# Patient Record
Sex: FEMALE | Race: WHITE
[De-identification: names, ages, dates, MRNs, and addresses within clinical notes are randomized per-mention and may not be internally consistent; named-entity substitution may affect disease eponyms.]

---

## 2018-06-05 ENCOUNTER — HOSPITAL ENCOUNTER (INPATIENT)
Dept: HOSPITAL 74 - JLDR | Age: 38
LOS: 4 days | Discharge: HOME | DRG: 540 | End: 2018-06-09
Attending: OBSTETRICS & GYNECOLOGY | Admitting: OBSTETRICS & GYNECOLOGY
Payer: COMMERCIAL

## 2018-06-05 VITALS — BODY MASS INDEX: 29.9 KG/M2

## 2018-06-05 DIAGNOSIS — O34.211: Primary | ICD-10-CM

## 2018-06-05 DIAGNOSIS — Z30.2: ICD-10-CM

## 2018-06-05 DIAGNOSIS — Z3A.38: ICD-10-CM

## 2018-06-05 LAB
ANION GAP SERPL CALC-SCNC: 7 MMOL/L (ref 8–16)
APTT BLD: 23.4 SECONDS (ref 26.9–34.4)
BASOPHILS # BLD: 0.3 % (ref 0–2)
BUN SERPL-MCNC: 10 MG/DL (ref 7–18)
CALCIUM SERPL-MCNC: 8.9 MG/DL (ref 8.5–10.1)
CHLORIDE SERPL-SCNC: 107 MMOL/L (ref 98–107)
CO2 SERPL-SCNC: 24 MMOL/L (ref 21–32)
CREAT SERPL-MCNC: 0.7 MG/DL (ref 0.55–1.02)
DEPRECATED RDW RBC AUTO: 14.9 % (ref 11.6–15.6)
EOSINOPHIL # BLD: 0.7 % (ref 0–4.5)
GLUCOSE SERPL-MCNC: 95 MG/DL (ref 74–106)
HCT VFR BLD CALC: 37.1 % (ref 32.4–45.2)
HGB BLD-MCNC: 12.5 GM/DL (ref 10.7–15.3)
INR BLD: 0.96 (ref 0.82–1.09)
LYMPHOCYTES # BLD: 22.9 % (ref 8–40)
MCH RBC QN AUTO: 29.8 PG (ref 25.7–33.7)
MCHC RBC AUTO-ENTMCNC: 33.6 G/DL (ref 32–36)
MCV RBC: 88.7 FL (ref 80–96)
MONOCYTES # BLD AUTO: 8.9 % (ref 3.8–10.2)
NEUTROPHILS # BLD: 67.2 % (ref 42.8–82.8)
PLATELET # BLD AUTO: 108 K/MM3 (ref 134–434)
PMV BLD: 12 FL (ref 7.5–11.1)
POTASSIUM SERPLBLD-SCNC: 4.6 MMOL/L (ref 3.5–5.1)
PT PNL PPP: 10.8 SEC (ref 9.7–13)
RBC # BLD AUTO: 4.18 M/MM3 (ref 3.6–5.2)
SODIUM SERPL-SCNC: 138 MMOL/L (ref 136–145)
WBC # BLD AUTO: 6.3 K/MM3 (ref 4–10)

## 2018-06-06 LAB
ARTERIAL BLOOD GAS PCO2: 53 MMHG (ref 35–45)
BASE EXCESS BLDA CALC-SCNC: -1.6 MEQ/L (ref -2–2)
PH BLDV: 7.37 [PH] (ref 7.32–7.42)
PO2 BLDA: 11.1 MMHG (ref 80–100)
SAO2 % BLDA: 11.7 % (ref 90–98.9)
VENOUS PC02: 43 MMHG (ref 38–52)
VENOUS PO2: 21.3 MMHG (ref 28–48)

## 2018-06-06 PROCEDURE — 0UL70ZZ OCCLUSION OF BILATERAL FALLOPIAN TUBES, OPEN APPROACH: ICD-10-PCS | Performed by: OBSTETRICS & GYNECOLOGY

## 2018-06-06 RX ADMIN — FERROUS SULFATE TAB EC 324 MG (65 MG FE EQUIVALENT) SCH: 324 (65 FE) TABLET DELAYED RESPONSE at 22:53

## 2018-06-06 RX ADMIN — IBUPROFEN PRN MG: 800 INJECTION INTRAVENOUS at 09:30

## 2018-06-06 RX ADMIN — LABETALOL HCL SCH: 100 TABLET, FILM COATED ORAL at 22:00

## 2018-06-06 RX ADMIN — FERROUS SULFATE TAB EC 324 MG (65 MG FE EQUIVALENT) SCH: 324 (65 FE) TABLET DELAYED RESPONSE at 09:42

## 2018-06-06 RX ADMIN — LABETALOL HCL SCH MG: 100 TABLET, FILM COATED ORAL at 10:15

## 2018-06-06 RX ADMIN — Medication SCH: at 10:20

## 2018-06-06 RX ADMIN — IBUPROFEN PRN MG: 800 INJECTION INTRAVENOUS at 21:57

## 2018-06-06 NOTE — HP
Past Medical History





- Admission


Chief Complaint: Labor pain


History of Present Illness: 





36 yo  @ 38 weeks gestation with h/o Chronic hypertension and gestational 

diabetes, admitted for labor pain.


She had 2 previous C-Sections. She 's pre op for repeat  and bilateral 

tubal ligation as consent was previously signed.


History Source: Patient





- Past Medical History


...: 4


...Para: 2


...Term: 1


...: 1


...Spon : 1


...Induced : 0


...Multiple Gestation: 0


...LMP: 17


... Weeks Gestation by Dates: 37.2


...EDC by Dates: 18


...EDC by Sono: 18





- Past Surgical History


Past Surgical History: Yes: 


Hx Myomectomy: No


Hx Transabdominal Cerclage: No





- Smoking History


Smoking history: Never smoked


Have you smoked in the past 12 months: No





- Alcohol/Substance Use


Hx Alcohol Use: No





Home Medications





- Allergies


Allergies/Adverse Reactions: 


 Allergies











Allergy/AdvReac Type Severity Reaction Status Date / Time


 


No Known Allergies Allergy   Verified 18 19:22














- Home Medications


Home Medications: 


Ambulatory Orders





Aspirin [Mahnomen Aspirin] 81 mg PO DAILY 18 


Labetalol HCl 200 mg PO DAILY 18 


Prenatal Vit 108/Iron/Folic AC [Prenatal One Tablet] 1 each PO DAILY 18 











Family Disease History





- Family Disease History


Family History: Unremarkable





Review of Systems





- Review of Systems


Constitutional: reports: No Symptoms


Eyes: reports: No Symptoms


HENT: reports: No Symptoms


Neck: reports: No Symptoms


Cardiovascular: reports: No Symptoms


Respiratory: reports: No Symptoms


Gastrointestinal: reports: No Symptoms


Genitourinary: reports: Pain


Breasts: reports: No Symptoms Reported


Musculoskeletal: reports: No Symptoms


Integumentary: reports: No Symptoms


Neurological: reports: No Symptoms


Endocrine: reports: No Symptoms


Hematology/Lymphatic: reports: No Symptoms


Psychiatric: reports: No Symptoms


Pain Intensity: 7





Physical Exam - Maternity


Vital Signs: 


 Vital Signs











Temperature  98.2 F   18 05:50


 


Pulse Rate  94 H  18 05:50


 


Respiratory Rate  20   18 05:50


 


Blood Pressure  124/94   18 05:50


 


O2 Sat by Pulse Oximetry (%)      











Constitutional: Yes: Well Nourished


Eyes: Yes: Conjunctiva Clear


HENT: Yes: Atraumatic


Neck: Yes: Supple


Cardiovascular: Yes: Regular Rate and Rhythm


Lungs: Clear to auscultation





- Abdominal Exam/OB


Number of Fetuses: Single


Fetal Presentation: Vertex





- Vaginal Exam/OB


Vaginal Bleediing: No


Fetal Presentation: Vertex/Position





- Physical Exam


Musculoskeletal: Yes: WNL


Extremities: Yes: WNL


...Motor Strength: WNL


Psychiatric: Yes: Alert, Oriented





- Labs


Lab Results: 


 CBC, BMP





 18 21:55 





 18 22:30 











Problem List





- Problems


(1) Previous  section complicating pregnancy, antepartum condition or 

complication


Code(s): O34.219 - MATERNAL CARE FOR UNSP TYPE SCAR FROM PREVIOUS  DEL 

  





(2) Status post repeat low transverse  section


Code(s): Z98.891 - HISTORY OF UTERINE SCAR FROM PREVIOUS SURGERY   





Assessment/Plan





Previous  in labor


Chronic hypertension


Pre op for repeat  and BTL


Consent signed


Anesthesia to see patient

## 2018-06-06 NOTE — OP
Operative Note





- Note:


Operative Date: 18


Pre-Operative Diagnosis: Previous  in labor / Multiparity


Operation: Repeat Low Transverse  / Bilateral tubal ligation


Post-Operative Diagnosis: Same as Pre-op


Surgeon: Elodia Estrada


Assistant: Tripp Wilks


Anesthesia: Spinal


Specimens Removed: Placenta


Estimated Blood Loss (mls): 600

## 2018-06-07 LAB
BASOPHILS # BLD: 0.2 % (ref 0–2)
DEPRECATED RDW RBC AUTO: 15 % (ref 11.6–15.6)
EOSINOPHIL # BLD: 0.2 % (ref 0–4.5)
HCT VFR BLD CALC: 34.4 % (ref 32.4–45.2)
HGB BLD-MCNC: 11.7 GM/DL (ref 10.7–15.3)
LYMPHOCYTES # BLD: 12.1 % (ref 8–40)
MCH RBC QN AUTO: 30.3 PG (ref 25.7–33.7)
MCHC RBC AUTO-ENTMCNC: 34.1 G/DL (ref 32–36)
MCV RBC: 88.7 FL (ref 80–96)
MONOCYTES # BLD AUTO: 5.7 % (ref 3.8–10.2)
NEUTROPHILS # BLD: 81.8 % (ref 42.8–82.8)
PLATELET # BLD AUTO: 99 K/MM3 (ref 134–434)
PMV BLD: 11.3 FL (ref 7.5–11.1)
RBC # BLD AUTO: 3.88 M/MM3 (ref 3.6–5.2)
WBC # BLD AUTO: 9.4 K/MM3 (ref 4–10)

## 2018-06-07 RX ADMIN — Medication SCH TAB: at 10:07

## 2018-06-07 RX ADMIN — LABETALOL HCL SCH MG: 100 TABLET, FILM COATED ORAL at 10:07

## 2018-06-07 RX ADMIN — FERROUS SULFATE TAB EC 324 MG (65 MG FE EQUIVALENT) SCH MG: 324 (65 FE) TABLET DELAYED RESPONSE at 21:54

## 2018-06-07 RX ADMIN — SIMETHICONE CHEW TAB 80 MG PRN MG: 80 TABLET ORAL at 19:38

## 2018-06-07 RX ADMIN — IBUPROFEN PRN MG: 600 TABLET, FILM COATED ORAL at 15:38

## 2018-06-07 RX ADMIN — IBUPROFEN PRN MG: 800 INJECTION INTRAVENOUS at 05:23

## 2018-06-07 RX ADMIN — SIMETHICONE CHEW TAB 80 MG PRN MG: 80 TABLET ORAL at 15:38

## 2018-06-07 RX ADMIN — FERROUS SULFATE TAB EC 324 MG (65 MG FE EQUIVALENT) SCH MG: 324 (65 FE) TABLET DELAYED RESPONSE at 10:07

## 2018-06-07 RX ADMIN — IBUPROFEN PRN MG: 600 TABLET, FILM COATED ORAL at 19:39

## 2018-06-07 RX ADMIN — LABETALOL HCL SCH MG: 100 TABLET, FILM COATED ORAL at 21:54

## 2018-06-07 NOTE — PN
Post Partum Progress Note





- Subjective


Subjective: 





Pt seen/evaluated, doing well.  No complaints.  Tolerating clears.  Lopez 

catheter draining clear yellow urine.  Denies CP/SOB/F/C/HA. 


Type of Delivery: Repeat C/S


Vital Signs: 


 Vital Signs











Temperature  98.2 F   18 05:22


 


Pulse Rate  68   18 05:22


 


Respiratory Rate  20   18 05:53


 


Blood Pressure  125/78   18 05:22


 


O2 Sat by Pulse Oximetry (%)  100   18 10:00











Uterus: Yes: Fundus Firm, Fundus below umbilicus


Incision: Yes: Dressing dry and intact


Abdomen/GI: Yes: Abdomen soft, Tender (appropriately tender post op), 

Tolerating PO (clears).  No: Passing flatus


Lochia: Yes: Rubra


Lochia, amount: Moderate


Extremities: Yes: Calves non-tender


Perineum: Yes: Intact


Activity: Ambulating





- Labs


Labs: 


 CBC











WBC  6.3 K/mm3 (4.0-10.0)   18  21:55    


 


RBC  4.18 M/mm3 (3.60-5.2)   18  21:55    


 


Hgb  12.5 GM/dL (10.7-15.3)   18  21:55    


 


Hct  37.1 % (32.4-45.2)   18  21:55    


 


MCV  88.7 fl (80-96)   18  21:55    


 


MCH  29.8 pg (25.7-33.7)   18  21:55    


 


MCHC  33.6 g/dl (32.0-36.0)   18  21:55    


 


RDW  14.9 % (11.6-15.6)   18  21:55    


 


Plt Count  108 K/MM3 (134-434)  L  18  21:55    


 


MPV  12.0 fl (7.5-11.1)  H  18  21:55    


 


Absolute Neuts (auto)  4.3 #  18  21:55    


 


Neutrophils %  67.2 % (42.8-82.8)   18  21:55    


 


Lymphocytes %  22.9 % (8-40)   18  21:55    


 


Monocytes %  8.9 % (3.8-10.2)   18  21:55    


 


Eosinophils %  0.7 % (0-4.5)   18  21:55    


 


Basophils %  0.3 % (0-2.0)   18  21:55    


 


Nucleated RBC %  0 % (0-0)   18  21:55    














Problem List





- Problems


(1) Status post repeat low transverse  section


Code(s): Z98.891 - HISTORY OF UTERINE SCAR FROM PREVIOUS SURGERY   





Assessment/Plan





36 y/o POD#1 s/p  delivery (repeat)


AFVSS


CBC pending


moderate lochia, continue to monitor


remove lopez cather


encourage ambulation


routine care

## 2018-06-07 NOTE — PN
Progress Note (short form)





- Note


Progress Note: 





Anesthesia POD#1


S/P  under Spinal and DUramorph.


VSS, no N/V,pain is bearable. Legs are strong.


A/P


No complications to anesthesia seen.





Maggi Geronimo MD.

## 2018-06-08 RX ADMIN — LABETALOL HCL SCH MG: 100 TABLET, FILM COATED ORAL at 21:03

## 2018-06-08 RX ADMIN — IBUPROFEN PRN MG: 600 TABLET, FILM COATED ORAL at 21:07

## 2018-06-08 RX ADMIN — SIMETHICONE CHEW TAB 80 MG PRN MG: 80 TABLET ORAL at 02:16

## 2018-06-08 RX ADMIN — Medication SCH TAB: at 09:06

## 2018-06-08 RX ADMIN — SIMETHICONE CHEW TAB 80 MG PRN MG: 80 TABLET ORAL at 10:53

## 2018-06-08 RX ADMIN — LABETALOL HCL SCH MG: 100 TABLET, FILM COATED ORAL at 09:06

## 2018-06-08 RX ADMIN — IBUPROFEN PRN MG: 600 TABLET, FILM COATED ORAL at 02:17

## 2018-06-08 RX ADMIN — IBUPROFEN PRN MG: 600 TABLET, FILM COATED ORAL at 10:53

## 2018-06-08 RX ADMIN — FERROUS SULFATE TAB EC 324 MG (65 MG FE EQUIVALENT) SCH MG: 324 (65 FE) TABLET DELAYED RESPONSE at 09:06

## 2018-06-08 RX ADMIN — SIMETHICONE CHEW TAB 80 MG PRN MG: 80 TABLET ORAL at 21:07

## 2018-06-08 RX ADMIN — FERROUS SULFATE TAB EC 324 MG (65 MG FE EQUIVALENT) SCH MG: 324 (65 FE) TABLET DELAYED RESPONSE at 21:03

## 2018-06-08 NOTE — PN
Post Partum Note





- Post Partum


Date of Delivery: 18


Post Partum Day: 2


Vital Signs: 


 Vital Signs - 24 hr











  18





  13:30 18:00 21:51


 


Temperature 98.5 F 98.1 F 


 


Pulse Rate 73 72 71


 


Respiratory 20 20 18





Rate   


 


Blood Pressure 111/66 116/69 111/69














  18





  09:30


 


Temperature 98.1 F


 


Pulse Rate 68


 


Respiratory 20





Rate 


 


Blood Pressure 122/75














- Subjective


Subjective: No Complaints





- Objective


Afebrile: No


Breast: Not engorged


Abdomen: Soft, Non-tender


Uterus: Fundus firm, Non-tender


Vagina: Scant lochia


Extremities: Non-tender





- Assessment/Plan


(1) Status post repeat low transverse  section


Assessment: Other (POStop CS)   Plan: Routine Care

## 2018-06-09 VITALS — TEMPERATURE: 98.6 F | DIASTOLIC BLOOD PRESSURE: 81 MMHG | SYSTOLIC BLOOD PRESSURE: 129 MMHG | HEART RATE: 68 BPM

## 2018-06-09 LAB
BASOPHILS # BLD: 0.6 % (ref 0–2)
DEPRECATED RDW RBC AUTO: 14.6 % (ref 11.6–15.6)
EOSINOPHIL # BLD: 2.4 % (ref 0–4.5)
HCT VFR BLD CALC: 26.9 % (ref 32.4–45.2)
HGB BLD-MCNC: 9.3 GM/DL (ref 10.7–15.3)
LYMPHOCYTES # BLD: 22.4 % (ref 8–40)
MCH RBC QN AUTO: 30.5 PG (ref 25.7–33.7)
MCHC RBC AUTO-ENTMCNC: 34.7 G/DL (ref 32–36)
MCV RBC: 88 FL (ref 80–96)
MONOCYTES # BLD AUTO: 6.9 % (ref 3.8–10.2)
NEUTROPHILS # BLD: 67.7 % (ref 42.8–82.8)
PLATELET # BLD AUTO: 118 K/MM3 (ref 134–434)
PMV BLD: 10.5 FL (ref 7.5–11.1)
RBC # BLD AUTO: 3.06 M/MM3 (ref 3.6–5.2)
WBC # BLD AUTO: 6.9 K/MM3 (ref 4–10)

## 2018-06-09 RX ADMIN — FERROUS SULFATE TAB EC 324 MG (65 MG FE EQUIVALENT) SCH MG: 324 (65 FE) TABLET DELAYED RESPONSE at 09:14

## 2018-06-09 RX ADMIN — SIMETHICONE CHEW TAB 80 MG PRN MG: 80 TABLET ORAL at 09:17

## 2018-06-09 RX ADMIN — IBUPROFEN PRN MG: 600 TABLET, FILM COATED ORAL at 09:18

## 2018-06-09 RX ADMIN — LABETALOL HCL SCH MG: 100 TABLET, FILM COATED ORAL at 09:14

## 2018-06-09 RX ADMIN — Medication SCH TAB: at 09:14

## 2018-06-09 NOTE — DS
Physical Exam-GYN


Vital Signs: 


 Vital Signs











Temperature  99.0 F   18 21:02


 


Pulse Rate  79   18 21:02


 


Respiratory Rate  20   18 21:02


 


Blood Pressure  122/77   18 21:02


 


O2 Sat by Pulse Oximetry (%)  100   18 10:00











Constitutional: Yes: Well Nourished


Eyes: Yes: Conjunctiva Clear


HENT: Yes: Atraumatic


Neck: Yes: Supple


Cardiovascular: Yes: Regular Rate and Rhythm


Respiratory: Yes: Regular


Gastrointestinal: Yes: Normal Bowel Sounds


External Genitalia: Yes: Normal


Vaginal Exam: Yes: Normal


Cervix: Yes: Normal


Uterus: Yes: Firm


Breast(s): Yes: WNL


Extremities: Yes: WNL


Neurological: Yes: Alert, Oriented


...Motor Strength: WNL


Psychiatric: Yes: Alert, Oriented


Labs: 


 CBC, BMP





 18 08:15 





 18 22:30 











Delivery





- Delivery


Type of Anesthesia: Spinal


EBL (cc): 600





Delivery, Single Birth





- Stages of Labor


Date 1st Stage Initiatied: 18


Date of Delivery: 18


Time of Delivery: 08:16


Time Placenta Delivered: 08:17





- Condition of Infant


Pediatrician/Neonatologist Present: Yes


Name: Cadence Bourgeois


Infant Gender: Male


Birth Weight: 7 lb 7 oz


Total Hours ROM (Hrs/Mins): 2 minutes





- Apgar


  ** 1 Minute


Apgar Total Score: 9





  ** 5 Minutes


Apgar Total Score: 9





-  Feeding Plan


Initial Plan: Elected not to breastfeed exclusively throughout hospitalization





Discharge Summary


Reason For Visit: ADMIT-C/S


Current Active Problems





Previous  section complicating pregnancy, antepartum condition or 

complication (Acute)


Status post repeat low transverse  section (Acute)








Procedures: Principal: Repeat Low Transverse 


Hospital Course: 





Routine Post op care


Condition: Good





- Instructions


Diet, Activity, Other Instructions: 


Regular diet


No driving, no lifting x 4 weeks


F/U with MD in 1 week


Referrals: 


Elodia Estrada MD [Staff Physician] - 


Disposition: HOME





- Home Medications


Comprehensive Discharge Medication List: 


Ambulatory Orders





Aspirin [Rapides Aspirin] 81 mg PO DAILY 18 


Labetalol HCl 200 mg PO DAILY 18 


Prenatal Vit 108/Iron/Folic AC [Prenatal One Tablet] 1 each PO DAILY 18

## 2018-06-11 NOTE — PATH
Surgical Pathology Report



Patient Name:  REYES REYES, MILDRED

Accession #:  [id number]

[facility]. Rec. #:  [id number]                                                        

   /Age/Gender:  1980 (Age: 37) / F

Account:  [id number]                                                          

             Location: [facility] OBS/GYN

Taken:  2018

Received:  2018

Reported:  2018

Physicians:  Elodia Estrada M.D.

  



Specimen(s) Received

A: PLACENTA 

B: RIGHT FALLOPIAN TUBE 

C: LEFT FALLOPIAN TUBE 





Clinical History

 4 para 2, history of chronic hypertension,  section x2







Final Diagnosis

A. PLACENTA:

THIRD TRIMESTER PLACENTA.

TRIVASCULAR CORD.

MEMBRANES WITH NO DIAGNOSTIC ABNORMALITIES.



B. RIGHT PORTION OF FALLOPIAN TUBE:

COMPLETE CROSS SECTION OF THE FALLOPIAN TUBE IDENTIFIED.



C. LEFT PORTION OF FALLOPIAN TUBE:

COMPLETE CROSS SECTION OF THE FALLOPIAN TUBE IDENTIFIED.







***Electronically Signed***

Loree Peterson M.D.





Gross Description

A.  The specimen is received fresh labeled "placenta" and is a 609 gram, 19 x 16

x 3 cm. placenta with attached membranes and umbilical cord.  The attached

membranes are glistening and translucent and insert in a circummarginate matter

cross one quarter of the placental disc, up to 2 cm from the placental margin.

The umbilical cord measures 22 cm. in length and averages 1 cm. in diameter. 

The cord inserts marginally. No true knots or strictures are identified. Cut

surface of the umbilical cord reveals 3 vessels. The fetal surface is grey-blue

with minimal fibrin deposition and appropriate caliber vessels.  The maternal

surface is red-brown with focal defects.  Sectioning reveals red-brown, spongy

parenchyma.  No lesions are identified.  Representative sections are submitted

in three cassettes as follows: 1- membrane rolls and umbilical cord; 2-3- full

thickness sections of placenta.



B.  Received fresh labelled "right portion fallopian tube" is a 1.5 cm long by

0.5 cm in diameter portion of tissue consistent with a portion of fallopian

tube.  The fimbriated end is not identified. No focal lesions are identified. 

Sectioned and totally submitted in one cassette.



C.  Received fresh labelled "left portion fallopian tube" is a 1.4 cm long by

0.6 cm in diameter portion of tissue consistent with a portion of fallopian

tube.  The fimbriated end is not identified. No focal lesions are identified. 

Sectioned and totally submitted in one cassette.

[facility]/2018



[facility]/2018